# Patient Record
Sex: FEMALE | Race: WHITE | Employment: UNEMPLOYED | URBAN - METROPOLITAN AREA
[De-identification: names, ages, dates, MRNs, and addresses within clinical notes are randomized per-mention and may not be internally consistent; named-entity substitution may affect disease eponyms.]

---

## 2019-05-06 ENCOUNTER — HOSPITAL ENCOUNTER (EMERGENCY)
Age: 34
Discharge: HOME | End: 2019-05-06
Payer: MEDICAID

## 2019-05-06 VITALS
WEIGHT: 101 LBS | SYSTOLIC BLOOD PRESSURE: 102 MMHG | BODY MASS INDEX: 19.83 KG/M2 | DIASTOLIC BLOOD PRESSURE: 62 MMHG | OXYGEN SATURATION: 100 % | RESPIRATION RATE: 16 BRPM | HEART RATE: 66 BPM | TEMPERATURE: 98.1 F | HEIGHT: 60 IN

## 2019-05-06 DIAGNOSIS — S05.01XA CONJUNCTIVAL ABRASION, RIGHT, INITIAL ENCOUNTER: Primary | ICD-10-CM

## 2019-05-06 PROCEDURE — 99283 EMERGENCY DEPT VISIT LOW MDM: CPT

## 2019-05-06 PROCEDURE — 6370000000 HC RX 637 (ALT 250 FOR IP): Performed by: PHYSICIAN ASSISTANT

## 2019-05-06 RX ORDER — POLYMYXIN B SULF/TRIMETHOPRIM 10000-1/ML
1 DROPS OPHTHALMIC (EYE) EVERY 4 HOURS
Qty: 1 BOTTLE | Refills: 0 | Status: SHIPPED | OUTPATIENT
Start: 2019-05-06 | End: 2019-05-11

## 2019-05-06 RX ORDER — IBUPROFEN 600 MG/1
600 TABLET ORAL ONCE
Status: COMPLETED | OUTPATIENT
Start: 2019-05-06 | End: 2019-05-06

## 2019-05-06 RX ORDER — POLYMYXIN B SULFATE AND TRIMETHOPRIM 1; 10000 MG/ML; [USP'U]/ML
2 SOLUTION OPHTHALMIC ONCE
Status: COMPLETED | OUTPATIENT
Start: 2019-05-06 | End: 2019-05-06

## 2019-05-06 RX ADMIN — FLUORESCEIN SODIUM 1 MG: 1 STRIP OPHTHALMIC at 08:25

## 2019-05-06 RX ADMIN — POLYMYXIN B SULFATE, TRIMETHOPRIM SULFATE 2 DROP: 10000; 1 SOLUTION/ DROPS OPHTHALMIC at 09:12

## 2019-05-06 RX ADMIN — IBUPROFEN 600 MG: 600 TABLET, FILM COATED ORAL at 08:47

## 2019-05-06 ASSESSMENT — VISUAL ACUITY
OU: 20/70
OS: 20/15
OD: 20/70

## 2019-05-06 ASSESSMENT — PAIN SCALES - GENERAL
PAINLEVEL_OUTOF10: 9
PAINLEVEL_OUTOF10: 9

## 2019-05-06 ASSESSMENT — PAIN DESCRIPTION - PAIN TYPE: TYPE: ACUTE PAIN

## 2019-05-06 ASSESSMENT — PAIN DESCRIPTION - LOCATION: LOCATION: EYE

## 2019-05-06 ASSESSMENT — PAIN DESCRIPTION - DESCRIPTORS: DESCRIPTORS: STABBING

## 2019-05-06 ASSESSMENT — PAIN DESCRIPTION - ORIENTATION: ORIENTATION: RIGHT

## 2019-05-06 ASSESSMENT — PAIN DESCRIPTION - FREQUENCY: FREQUENCY: INTERMITTENT

## 2019-05-06 ASSESSMENT — PAIN DESCRIPTION - ONSET: ONSET: ON-GOING

## 2019-05-06 ASSESSMENT — PAIN DESCRIPTION - PROGRESSION: CLINICAL_PROGRESSION: GRADUALLY WORSENING

## 2019-05-06 NOTE — ED PROVIDER NOTES
[address] ED  EMERGENCY DEPARTMENT ENCOUNTER        Pt Name: Luis Chamorro  MRN: [id number]  [place] 1985  Date of evaluation: 5/6/2019  Provider: GARTH [place]  PCP: Aniyah Dykes MD    Patient was seen and evaluated by myself in conjunction with [facility], [address] supervising FELICIA. Supervising physician was on site and available for consult however Gisela Montes De Oca DO.  did not  independently see the patient. CHIEF COMPLAINT       Chief Complaint   Patient presents with    Foreign Body     Pt states that her daughter pked her in the eye last night. Pt wke up this AM with right eye pain and unable to keep it open. HISTORY OF PRESENT ILLNESS   (Location/Symptom, Timing/Onset, Context/Setting, Quality, Duration, Modifying Factors, Severity)  Note limiting factors. Luis Chamorro is a 35 y.o. female who presents for evaluation of right eye redness and foreign body sensation which is been going on since yesterday evening. Patient notes that her daughter was sitting on her lap yesterday afternoon and \"poked in the eye\" with her finger. Patient has not seen any foreign body in her eye by has sensation of FB. She endorses eye redness, tearing, eyes matted shut this morning and photophobia. She endorses blurred vision, no double vision. She denies pain with moving the eye. She notes that her last tetanus shot was about 3 years ago. She denies use of contact lenses. She denies having a personal optometrist/ophthalmologist.  She denies past medical history of eye issues. She denies headaches, congestion, runny nose, ear drainage. She denies nausea or vomiting. She denies any fevers. She denies past medical history of significant viral infection, chemotherapy, diabetes, other immunosuppression. She does not take any medicines on a regular basis other than Suboxone.      Nursing Notes were all reviewed and agreed with or any disagreements were addressed  in the HPI. REVIEW OF SYSTEMS    (2-9 systems for level 4, 10 or more for level 5)     Review of Systems    Positives and Pertinent negatives as per HPI. Except as noted abovein the ROS, all other systems were reviewed and negative.        PAST MEDICAL HISTORY     Past Medical History:   Diagnosis Date    [facility]VT [facility] thrombosis) ([facility] [address]     History of blood clot to lungs during pregnancy     Kidney congenitally absent, right     Late prenatal care     limited prenatal care    Precipitous delivery 1/19/2016    UTI (lower urinary tract infection)          SURGICAL HISTORY     Past Surgical History:   Procedure Laterality Date    APPENDECTOMY      CYSTOSCOPY N/A 8/14/14    DIAGNOSTIC LAPAROSCOPY    DILATION AND CURETTAGE OF UTERUS  8/2014         CURRENTMEDICATIONS       Discharge Medication List as of 5/6/2019  9:04 AM      CONTINUE these medications which have NOT CHANGED    Details   Buprenorphine HCl-Naloxone HCl (SUBOXONE SL) Place 8 mg under the tongue 2 times dailyHistorical Med      ibuprofen (ADVIL;MOTRIN) 800 MG tablet Take 1 tablet by mouth every 8 hours, Disp-60 tablet, R-1      Prenatal MV-Min-Fe Fum-FA-DHA (PRENATAL 1 PO) Take by mouth               ALLERGIES     Amoxicillin    FAMILYHISTORY       Family History   Problem Relation Age of Onset    High Blood Pressure Father     Cancer Maternal Grandmother     High Blood Pressure Maternal Grandmother     Heart Disease Maternal Grandfather     High Blood Pressure Maternal Grandfather           SOCIAL HISTORY       Social History     Socioeconomic History    Marital status: Single     Spouse name: None    Number of children: None    Years of education: None    Highest education level: None   Occupational History    None   Social Needs    Financial resource strain: None    Food insecurity:     Worry: None     Inability: None    Transportation needs:     Medical: None     Non-medical: None   Tobacco Use    Smoking status: Current Every icterus. Neck: Normal range of motion. Neck supple. Pulmonary/Chest: Effort normal. No respiratory distress. Neurological: She is alert and oriented to person, place, and time. Skin: Skin is warm and dry. She is not diaphoretic. Psychiatric: She has a normal mood and affect. Her behavior is normal.   Nursing note and vitals reviewed. DIAGNOSTIC RESULTS       No orders to display     [unfilled]      PROCEDURES   Unless otherwise noted below, none     Procedures   Observed slit lamp and woods examinations performed by Mir Woo PA-C     CRITICAL CARE TIME   N/A    CONSULTS:  None      EMERGENCY DEPARTMENT COURSE and DIFFERENTIALDIAGNOSIS/MDM:   Vitals:    Vitals:    05/06/19 0805 05/06/19 0916   BP: (!) 147/84 102/62   Pulse: 78 66   Resp: 18 16   Temp: 98.1 °F (36.7 °C)    TempSrc: Oral    SpO2: 98% 100%   Weight: 101 lb (45.8 kg)    Height: 5' (1.524 m)        Patient was given thefollowing medications:  Medications   fluorescein ophthalmic strip 1 mg (1 mg Both Eyes Given 5/6/19 0825)   ibuprofen (ADVIL;MOTRIN) tablet 600 mg (600 mg Oral Given 5/6/19 0847)   trimethoprim-polymyxin b (POLYTRIM) ophthalmic solution 2 drop (2 drops Right Eye Given 5/6/19 0912)       34 yo female presents with red eye and drainage s/p being poked in the eye by her young child. Ddx includes but is not limited to: corneal abrasion, corneal ulceration, eye foreign body, globe rupture, hyphema. Physical exam reassuring. + fluoresciene uptake at right conjunctiva, sparing the cornea, without evidence of FB or ulceration. Pt discharged with ophthalmic antibiotics and instructions to follow up with [facility] within 24 hours, or return here for new/worsening symptoms. FINAL IMPRESSION      1.  Conjunctival abrasion, right, initial encounter          DISPOSITION/PLAN   DISPOSITION Decision To Discharge 05/06/2019 08:40:02 AM      PATIENT REFERREDTO:  Salma Palmer MD  [address] [address]  [phone]    Schedule an appointment as soon as possible for a visit today  As needed, If symptoms worsen    [facility] [address] [facility]  [facility] [id number]  [phone]  Schedule an appointment as soon as possible for a visit   Follow up within the next 24 hours    [place] [facility]  [address] [facility]  [phone]    As needed, If symptoms worsen      DISCHARGE MEDICATIONS:  Discharge Medication List as of 5/6/2019  9:04 AM      START taking these medications    Details   POLYTRIM 44793-2.1 UNIT/ML-% ophthalmic solution Place 1 drop into the right eye every 4 hours for 5 days, Disp-1 Bottle, R-0, DAWPrint             DISCONTINUED MEDICATIONS:  Discharge Medication List as of 5/6/2019  9:04 AM          Called by [address] [address], polytrim ophthalmic on backorder. Will switch to cipro 0.3 ophthalmic two drops 4 times a day x 5 days.         (Please note that portions ofthis note were completed with a voice recognition program.  Efforts were made to edit the dictations but occasionally words are mis-transcribed.)    Kj Crowder (electronically signed)           Kj Crowder  05/06/19 8348

## 2020-02-11 ENCOUNTER — APPOINTMENT (OUTPATIENT)
Dept: CT IMAGING | Age: 35
End: 2020-02-11
Payer: MEDICAID

## 2020-02-11 ENCOUNTER — HOSPITAL ENCOUNTER (EMERGENCY)
Age: 35
Discharge: HOME | End: 2020-02-11
Attending: EMERGENCY MEDICINE
Payer: MEDICAID

## 2020-02-11 VITALS
BODY MASS INDEX: 22.58 KG/M2 | WEIGHT: 115 LBS | HEART RATE: 102 BPM | RESPIRATION RATE: 16 BRPM | HEIGHT: 60 IN | OXYGEN SATURATION: 97 % | DIASTOLIC BLOOD PRESSURE: 74 MMHG | TEMPERATURE: 99.4 F | SYSTOLIC BLOOD PRESSURE: 105 MMHG

## 2020-02-11 LAB
A/G RATIO: 1.3 (ref 1.1–2.2)
ALBUMIN SERPL-MCNC: 4 G/DL (ref 3.4–5)
ALP BLD-CCNC: 83 U/L (ref 40–129)
ALT SERPL-CCNC: 10 U/L (ref 10–40)
ANION GAP SERPL CALCULATED.3IONS-SCNC: 14 MMOL/L (ref 3–16)
AST SERPL-CCNC: 19 U/L (ref 15–37)
BACTERIA: ABNORMAL /HPF
BASOPHILS ABSOLUTE: 0 K/UL (ref 0–0.2)
BASOPHILS RELATIVE PERCENT: 0.5 %
BILIRUB SERPL-MCNC: 0.5 MG/DL (ref 0–1)
BILIRUBIN URINE: NEGATIVE
BLOOD, URINE: ABNORMAL
BUN BLDV-MCNC: 11 MG/DL (ref 7–20)
CALCIUM SERPL-MCNC: 8.7 MG/DL (ref 8.3–10.6)
CHLORIDE BLD-SCNC: 101 MMOL/L (ref 99–110)
CLARITY: ABNORMAL
CO2: 21 MMOL/L (ref 21–32)
COLOR: YELLOW
CREAT SERPL-MCNC: 0.8 MG/DL (ref 0.6–1.1)
EOSINOPHILS ABSOLUTE: 0.1 K/UL (ref 0–0.6)
EOSINOPHILS RELATIVE PERCENT: 1.4 %
EPITHELIAL CELLS, UA: ABNORMAL /HPF
GFR AFRICAN AMERICAN: >60
GFR NON-AFRICAN AMERICAN: >60
GLOBULIN: 3.2 G/DL
GLUCOSE BLD-MCNC: 103 MG/DL (ref 70–99)
GLUCOSE URINE: NEGATIVE MG/DL
HCG QUALITATIVE: NEGATIVE
HCT VFR BLD CALC: 37.5 % (ref 36–48)
HEMOGLOBIN: 13 G/DL (ref 12–16)
KETONES, URINE: NEGATIVE MG/DL
LACTIC ACID: 1.4 MMOL/L (ref 0.4–2)
LEUKOCYTE ESTERASE, URINE: ABNORMAL
LIPASE: 11 U/L (ref 13–60)
LYMPHOCYTES ABSOLUTE: 1.1 K/UL (ref 1–5.1)
LYMPHOCYTES RELATIVE PERCENT: 13.3 %
MCH RBC QN AUTO: 31.9 PG (ref 26–34)
MCHC RBC AUTO-ENTMCNC: 34.6 G/DL (ref 31–36)
MCV RBC AUTO: 92.2 FL (ref 80–100)
MICROSCOPIC EXAMINATION: YES
MONOCYTES ABSOLUTE: 0.1 K/UL (ref 0–1.3)
MONOCYTES RELATIVE PERCENT: 0.7 %
NEUTROPHILS ABSOLUTE: 6.8 K/UL (ref 1.7–7.7)
NEUTROPHILS RELATIVE PERCENT: 84.1 %
NITRITE, URINE: POSITIVE
PDW BLD-RTO: 12.8 % (ref 12.4–15.4)
PH UA: 6 (ref 5–8)
PLATELET # BLD: 197 K/UL (ref 135–450)
PMV BLD AUTO: 9 FL (ref 5–10.5)
POTASSIUM REFLEX MAGNESIUM: 4.5 MMOL/L (ref 3.5–5.1)
PROTEIN UA: 100 MG/DL
RBC # BLD: 4.07 M/UL (ref 4–5.2)
RBC UA: ABNORMAL /HPF (ref 0–2)
SODIUM BLD-SCNC: 136 MMOL/L (ref 136–145)
SPECIFIC GRAVITY UA: 1.02 (ref 1–1.03)
TOTAL PROTEIN: 7.2 G/DL (ref 6.4–8.2)
URINE REFLEX TO CULTURE: YES
URINE TYPE: ABNORMAL
UROBILINOGEN, URINE: 0.2 E.U./DL
WBC # BLD: 8.1 K/UL (ref 4–11)
WBC UA: >100 /HPF (ref 0–5)

## 2020-02-11 PROCEDURE — 96375 TX/PRO/DX INJ NEW DRUG ADDON: CPT

## 2020-02-11 PROCEDURE — 74176 CT ABD & PELVIS W/O CONTRAST: CPT

## 2020-02-11 PROCEDURE — 87086 URINE CULTURE/COLONY COUNT: CPT

## 2020-02-11 PROCEDURE — 80053 COMPREHEN METABOLIC PANEL: CPT

## 2020-02-11 PROCEDURE — 2580000003 HC RX 258: Performed by: EMERGENCY MEDICINE

## 2020-02-11 PROCEDURE — 84703 CHORIONIC GONADOTROPIN ASSAY: CPT

## 2020-02-11 PROCEDURE — 96365 THER/PROPH/DIAG IV INF INIT: CPT

## 2020-02-11 PROCEDURE — 87186 SC STD MICRODIL/AGAR DIL: CPT

## 2020-02-11 PROCEDURE — 83690 ASSAY OF LIPASE: CPT

## 2020-02-11 PROCEDURE — 99283 EMERGENCY DEPT VISIT LOW MDM: CPT

## 2020-02-11 PROCEDURE — 81001 URINALYSIS AUTO W/SCOPE: CPT

## 2020-02-11 PROCEDURE — 85025 COMPLETE CBC W/AUTO DIFF WBC: CPT

## 2020-02-11 PROCEDURE — 6360000002 HC RX W HCPCS: Performed by: EMERGENCY MEDICINE

## 2020-02-11 PROCEDURE — 87077 CULTURE AEROBIC IDENTIFY: CPT

## 2020-02-11 PROCEDURE — 83605 ASSAY OF LACTIC ACID: CPT

## 2020-02-11 RX ORDER — 0.9 % SODIUM CHLORIDE 0.9 %
30 INTRAVENOUS SOLUTION INTRAVENOUS ONCE
Status: COMPLETED | OUTPATIENT
Start: 2020-02-11 | End: 2020-02-11

## 2020-02-11 RX ORDER — GABAPENTIN 300 MG/1
300 CAPSULE ORAL 3 TIMES DAILY
COMMUNITY

## 2020-02-11 RX ORDER — ONDANSETRON 4 MG/1
4 TABLET, FILM COATED ORAL EVERY 8 HOURS PRN
Qty: 20 TABLET | Refills: 0 | Status: SHIPPED | OUTPATIENT
Start: 2020-02-11

## 2020-02-11 RX ORDER — KETOROLAC TROMETHAMINE 30 MG/ML
30 INJECTION, SOLUTION INTRAMUSCULAR; INTRAVENOUS ONCE
Status: COMPLETED | OUTPATIENT
Start: 2020-02-11 | End: 2020-02-11

## 2020-02-11 RX ORDER — CEFUROXIME AXETIL 500 MG/1
500 TABLET ORAL 2 TIMES DAILY
Qty: 20 TABLET | Refills: 0 | Status: SHIPPED | OUTPATIENT
Start: 2020-02-11 | End: 2020-02-21

## 2020-02-11 RX ADMIN — CEFTRIAXONE SODIUM 1 G: 1 INJECTION, POWDER, FOR SOLUTION INTRAMUSCULAR; INTRAVENOUS at 10:21

## 2020-02-11 RX ADMIN — KETOROLAC TROMETHAMINE 30 MG: 30 INJECTION, SOLUTION INTRAMUSCULAR at 10:22

## 2020-02-11 RX ADMIN — SODIUM CHLORIDE 1566 ML: 9 INJECTION, SOLUTION INTRAVENOUS at 10:21

## 2020-02-11 ASSESSMENT — PAIN SCALES - GENERAL
PAINLEVEL_OUTOF10: 10
PAINLEVEL_OUTOF10: 10

## 2020-02-11 ASSESSMENT — PAIN DESCRIPTION - ORIENTATION: ORIENTATION: LEFT

## 2020-02-11 ASSESSMENT — PAIN DESCRIPTION - LOCATION: LOCATION: FLANK

## 2020-02-11 NOTE — ED NOTES
Discharge instructions reviewed with pt. She verbalized understanding. Pt ambulated out of ED in stable condition.       Art Wheeler RN  02/11/20 0768

## 2020-02-11 NOTE — ED PROVIDER NOTES
CHIEF COMPLAINT  Flank Pain (pt c/o left sided flank pain with emesis since yesterday)      HISTORY OF PRESENT ILLNESS  Eleni Vasques is a 29 y.o. female presents to the ED with dysuria and flank pain. The patient states that she has had dysuria and urinary frequency last few days, started having cramping pain in her low abdomen and then in her left flank last night, felt like she is had a fever and has had nausea with an episode of vomitus vomiting this morning. The patient has had no cough or rhinorrhea is not short of breath. She is not pregnant. She has a congenitally absent right kidney. No other complaints, modifying factors or associated symptoms. I have reviewed the following from the nursing documentation.     Past Medical History:   Diagnosis Date    [facility]     History of blood clot to lungs during pregnancy     Kidney congenitally absent, right     Late prenatal care     limited prenatal care    Precipitous delivery 1/19/2016    UTI (lower urinary tract infection)      Past Surgical History:   Procedure Laterality Date    APPENDECTOMY      CYSTOSCOPY N/A 8/14/14, 1/24/2020    DIAGNOSTIC LAPAROSCOPY    DILATION AND CURETTAGE OF UTERUS  8/2014     Family History   Problem Relation Age of Onset    High Blood Pressure Father     Cancer Maternal Grandmother     High Blood Pressure Maternal Grandmother     Heart Disease Maternal Grandfather     High Blood Pressure Maternal Grandfather      Social History     Socioeconomic History    Marital status: Single     Spouse name: Not on file    Number of children: Not on file    Years of education: Not on file    Highest education level: Not on file   Occupational History    Not on file   Social Needs    Financial resource strain: Not on file    Food insecurity:     Worry: Not on file     Inability: Not on file    Transportation needs:     Medical: Not on file     Non-medical: Not on file   Tobacco Use    Smoking status: Current Every Day Smoker     Packs/day: 0.50     Types: Cigarettes    Smokeless tobacco: Never Used   Substance and Sexual Activity    Alcohol use: No    Drug use: No    Sexual activity: Yes     Partners: Male   Lifestyle    Physical activity:     Days per week: Not on file     Minutes per session: Not on file    Stress: Not on file   Relationships    Social connections:     Talks on phone: Not on file     Gets together: Not on file     Attends Caodaism service: Not on file     Active member of club or organization: Not on file     Attends meetings of clubs or organizations: Not on file     Relationship status: Not on file    Intimate partner violence:     Fear of current or ex partner: Not on file     Emotionally abused: Not on file     Physically abused: Not on file     Forced sexual activity: Not on file   Other Topics Concern    Not on file   Social History Narrative    Not on file     Current Facility-Administered Medications   Medication Dose Route Frequency Provider Last Rate Last Dose    0.9 % sodium chloride IV bolus 1,566 mL  30 mL/kg Intravenous Once Luanne Contreras MD        ketorolac (TORADOL) injection 30 mg  30 mg Intravenous Once Luanne Contreras MD        cefTRIAXone (ROCEPHIN) 1 g IVPB in 50 mL D5W minibag  1 g Intravenous Once Luanne Contreras MD         Current Outpatient Medications   Medication Sig Dispense Refill    gabapentin (NEURONTIN) 300 MG capsule Take 300 mg by mouth 3 times daily.  Buprenorphine HCl-Naloxone HCl (SUBOXONE SL) Place 8.5 mg under the tongue 2 times daily       ibuprofen (ADVIL;MOTRIN) 800 MG tablet Take 1 tablet by mouth every 8 hours 60 tablet 1    Prenatal MV-Min-Fe Fum-FA-DHA (PRENATAL 1 PO) Take by mouth       Allergies   Allergen Reactions    Amoxicillin        REVIEW OF SYSTEMS  10 systems reviewed, pertinent positives per HPI otherwise noted to be negative.     PHYSICAL EXAM  /84   Pulse 126   Temp 99.4 °F (37.4 °C) (Oral)   Resp 16   Ht 5' (1.524 m)   Wt 115 lb (52.2 kg)   SpO2 100%   BMI 22.46 kg/m²   GENERAL APPEARANCE: Awake and alert. Cooperative. Tired but nontoxic, mild distress  HEAD: Normocephalic. Atraumatic. EYES: PERRL. EOM's grossly intact. ENT: Mucous membranes are moist.   NECK: Supple. HEART: Tachycardic but regular rhythm. No murmurs  LUNGS: Respirations are unlabored. Lungs are clear bilaterally. ABDOMEN: Soft. Non-distended. Non-tender. No guarding or rebound. Normal bowel sounds. EXTREMITIES: No peripheral edema. Moves all extremities equally. All extremities neurovascularly intact. SKIN: Warm and dry. No acute rashes. NEUROLOGICAL: Alert and oriented. No gross facial drooping.y  PSYCHIATRIC: Normal mood and affect. LABS  I have reviewed all labs for this visit.    Results for orders placed or performed during the hospital encounter of 02/11/20   CBC Auto Differential   Result Value Ref Range    WBC 8.1 4.0 - 11.0 K/uL    RBC 4.07 4.00 - 5.20 M/uL    Hemoglobin 13.0 12.0 - 16.0 g/dL    Hematocrit 37.5 36.0 - 48.0 %    MCV 92.2 80.0 - 100.0 fL    MCH 31.9 26.0 - 34.0 pg    MCHC 34.6 31.0 - 36.0 g/dL    RDW 12.8 12.4 - 15.4 %    Platelets 199 923 - 456 K/uL    MPV 9.0 5.0 - 10.5 fL    Neutrophils % 84.1 %    Lymphocytes % 13.3 %    Monocytes % 0.7 %    Eosinophils % 1.4 %    Basophils % 0.5 %    Neutrophils Absolute 6.8 1.7 - 7.7 K/uL    Lymphocytes Absolute 1.1 1.0 - 5.1 K/uL    Monocytes Absolute 0.1 0.0 - 1.3 K/uL    Eosinophils Absolute 0.1 0.0 - 0.6 K/uL    Basophils Absolute 0.0 0.0 - 0.2 K/uL   Comprehensive Metabolic Panel w/ Reflex to MG   Result Value Ref Range    Sodium 136 136 - 145 mmol/L    Potassium reflex Magnesium 4.5 3.5 - 5.1 mmol/L    Chloride 101 99 - 110 mmol/L    CO2 21 21 - 32 mmol/L    Anion Gap 14 3 - 16    Glucose 103 (H) 70 - 99 mg/dL    BUN 11 7 - 20 mg/dL    CREATININE 0.8 0.6 - 1.1 mg/dL    GFR Non-African American >60 >60    GFR  American >60 >60    Calcium 8.7 8.3 - 10.6 mg/dL    Total Protein 7.2 6.4 - 8.2 g/dL    Alb 4.0 3.4 - 5.0 g/dL    Albumin/Globulin Ratio 1.3 1.1 - 2.2    Total Bilirubin 0.5 0.0 - 1.0 mg/dL    Alkaline Phosphatase 83 40 - 129 U/L    ALT 10 10 - 40 U/L    AST 19 15 - 37 U/L    Globulin 3.2 g/dL   HCG Qualitative, Serum   Result Value Ref Range    hCG Qual Negative Detects HCG level >10 MIU/mL   Lipase   Result Value Ref Range    Lipase 11.0 (L) 13.0 - 60.0 U/L   Urinalysis Reflex to Culture   Result Value Ref Range    Color, UA Yellow Straw/Yellow    Clarity, UA CLOUDY (A) Clear    Glucose, Ur Negative Negative mg/dL    Bilirubin Urine Negative Negative    Ketones, Urine Negative Negative mg/dL    Specific Gravity, UA 1.020 1.005 - 1.030    Blood, Urine MODERATE (A) Negative    pH, UA 6.0 5.0 - 8.0    Protein,  (A) Negative mg/dL    Urobilinogen, Urine 0.2 <2.0 E.U./dL    Nitrite, Urine POSITIVE (A) Negative    Leukocyte Esterase, Urine MODERATE (A) Negative    Microscopic Examination YES     Urine Type NotGiven     Urine Reflex to Culture Yes    Microscopic Urinalysis   Result Value Ref Range    WBC, UA >100 (A) 0 - 5 /HPF    RBC, UA 3-5 (A) 0 - 2 /HPF    Epi Cells 10-20 /HPF    Bacteria, UA 4+ (A) /HPF         RADIOLOGY  Ct Abdomen Pelvis Wo Contrast Additional Contrast? None    Result Date: 2/11/2020  EXAMINATION: CT OF THE ABDOMEN AND PELVIS WITHOUT CONTRAST 2/11/2020 10:33 am TECHNIQUE: CT of the abdomen and pelvis was performed without the administration of intravenous contrast. Multiplanar reformatted images are provided for review. Dose modulation, iterative reconstruction, and/or weight based adjustment of the mA/kV was utilized to reduce the radiation dose to as low as reasonably achievable.  COMPARISON: 01/12/2015 HISTORY: ORDERING SYSTEM PROVIDED HISTORY: flank pain, fever TECHNOLOGIST PROVIDED HISTORY: Reason for exam:->flank pain, fever Additional Contrast?->None Is the patient pregnant?->No Reason for Exam: lt flank pain, fever Acuity: Acute Type of Exam: Initial FINDINGS: Lower Chest: Lung bases clear Organs: Absent right kidney. Compensatory hypertrophy of the left kidney. No urolithiasis or hydronephrosis. Remaining solid organs and gallbladder have an unremarkable noncontrast appearance. GI/Bowel: No gastrointestinal abnormality demonstrated. Appendix not visualized Pelvis: Uterus is located in the extreme left side of the pelvis but is grossly unremarkable. Cholecystectomy clips are located between the lower uterine segment and urinary bladder. The ovaries are normal in size and fairly laterally situated, anterior to the psoas muscles. There is slight haziness of the perivesical fat. There is a small amount of free pelvic fluid. Peritoneum/Retroperitoneum: No abdominal ascites or pneumoperitoneum. Aorta unremarkable Bones/Soft Tissues: No bony abnormality     1. Appearance of the urinary bladder suggests cystitis. Correlate with urinalysis 2. Absent right kidney. No evidence of left-sided obstructive uropathy       ED COURSE/MDM  Patient seen and evaluated. Old records reviewed. Labs and imaging reviewed and results discussed with patient. I considered UTI, pyelonephritis or kidney stone, CT showed no kidney stone, urine showed signs of infection. The patient has a congenitally absent right kidney, so we will give fluids, antibiotics, antiemetics and antipyretics, she is feeling better at 11:20 AM.  I advised that she be admitted to the hospital given that she has a congenitally absent kidney, the patient declined because she has a 3year-old at home and she feels better. I told her she can come back at any time and she should especially be back if she is vomiting or having more fevers. Otherwise she is to follow-up with her doctor within 4 days. Patient was given scripts for the following medications. I counseled patient how to take these medications.    New Prescriptions    CEFUROXIME (CEFTIN) 500 MG TABLET    Take 1 tablet by mouth 2 times daily for 10 days    ONDANSETRON (ZOFRAN) 4 MG TABLET    Take 1 tablet by mouth every 8 hours as needed for Nausea       CLINICAL IMPRESSION  1. Acute pyelonephritis        Blood pressure 123/84, pulse 126, temperature 99.4 °F (37.4 °C), temperature source Oral, resp. rate 16, height 5' (1.524 m), weight 115 lb (52.2 kg), SpO2 100 %, unknown if currently breastfeeding. DISPOSITION  Cheri Dalal was discharged to home in stable condition.                  Roberto Ortiz MD  02/11/20 1735

## 2020-02-13 LAB
ORGANISM: ABNORMAL
URINE CULTURE, ROUTINE: ABNORMAL

## 2025-04-02 ENCOUNTER — OFFICE VISIT (OUTPATIENT)
Age: 40
End: 2025-04-02

## 2025-04-02 VITALS
OXYGEN SATURATION: 96 % | HEART RATE: 60 BPM | DIASTOLIC BLOOD PRESSURE: 63 MMHG | TEMPERATURE: 98.3 F | SYSTOLIC BLOOD PRESSURE: 104 MMHG

## 2025-04-02 DIAGNOSIS — N30.00 ACUTE CYSTITIS WITHOUT HEMATURIA: Primary | ICD-10-CM

## 2025-04-02 PROBLEM — B19.10 HEPATITIS B VIRUS INFECTION: Status: ACTIVE | Noted: 2020-10-29

## 2025-04-02 PROBLEM — R87.619 ABNORMAL PAP SMEAR OF CERVIX: Status: ACTIVE | Noted: 2020-01-07

## 2025-04-02 PROBLEM — E03.9 HYPOTHYROIDISM: Status: ACTIVE | Noted: 2021-10-25

## 2025-04-02 PROBLEM — D06.9 CERVICAL INTRAEPITHELIAL NEOPLASIA GRADE III WITH SEVERE DYSPLASIA: Status: ACTIVE | Noted: 2025-04-02

## 2025-04-02 PROBLEM — R87.610 ASCUS WITH POSITIVE HIGH RISK HPV CERVICAL: Status: ACTIVE | Noted: 2019-12-19

## 2025-04-02 PROBLEM — F41.9 ANXIETY: Status: ACTIVE | Noted: 2021-10-25

## 2025-04-02 PROBLEM — Z71.3 DIETARY COUNSELING AND SURVEILLANCE: Status: ACTIVE | Noted: 2020-10-29

## 2025-04-02 PROBLEM — Z86.711 HISTORY OF PULMONARY EMBOLISM: Status: ACTIVE | Noted: 2020-04-23

## 2025-04-02 PROBLEM — K64.9 HEMORRHOIDS: Status: ACTIVE | Noted: 2019-05-03

## 2025-04-02 PROBLEM — R53.83 LOW ENERGY: Status: ACTIVE | Noted: 2021-10-25

## 2025-04-02 PROBLEM — R87.810 ASCUS WITH POSITIVE HIGH RISK HPV CERVICAL: Status: ACTIVE | Noted: 2019-12-19

## 2025-04-02 PROBLEM — N94.10 DYSPAREUNIA IN FEMALE: Status: ACTIVE | Noted: 2019-05-03

## 2025-04-02 LAB
BILIRUBIN, POC: NORMAL
BLOOD URINE, POC: NEGATIVE
CLARITY, POC: CLEAR
COLOR, POC: NORMAL
GLUCOSE URINE, POC: 100 MG/DL
KETONES, POC: NEGATIVE MG/DL
LEUKOCYTE EST, POC: NORMAL
NITRITE, POC: POSITIVE
PH, POC: 7
PROTEIN, POC: 30 MG/DL
SPECIFIC GRAVITY, POC: 1.02
UROBILINOGEN, POC: 2 MG/DL

## 2025-04-02 RX ORDER — ONDANSETRON 4 MG/1
4 TABLET, ORALLY DISINTEGRATING ORAL EVERY 8 HOURS PRN
Qty: 15 TABLET | Refills: 0 | Status: SHIPPED | OUTPATIENT
Start: 2025-04-02 | End: 2025-04-07

## 2025-04-02 RX ORDER — SULFAMETHOXAZOLE AND TRIMETHOPRIM 800; 160 MG/1; MG/1
1 TABLET ORAL 2 TIMES DAILY
Qty: 14 TABLET | Refills: 0 | Status: SHIPPED | OUTPATIENT
Start: 2025-04-02 | End: 2025-04-09

## 2025-04-02 RX ORDER — BUPRENORPHINE 300 MG/1
SOLUTION SUBCUTANEOUS
COMMUNITY
Start: 2025-03-24

## 2025-04-02 RX ORDER — PHENAZOPYRIDINE HYDROCHLORIDE 200 MG/1
200 TABLET, FILM COATED ORAL 3 TIMES DAILY PRN
Qty: 9 TABLET | Refills: 0 | Status: SHIPPED | OUTPATIENT
Start: 2025-04-02 | End: 2025-04-05

## 2025-04-02 ASSESSMENT — ENCOUNTER SYMPTOMS
NAUSEA: 1
VOMITING: 0

## 2025-04-02 NOTE — PROGRESS NOTES
There has been no fever. Associated symptoms include frequency, nausea and urgency. Pertinent negatives include no chills, discharge, flank pain, hematuria, hesitancy, sweats or vomiting. Treatments tried: Azo. The treatment provided mild relief. Her past medical history is significant for recurrent UTIs (in the past, not recently).       VITAL SIGNS  Vitals:    04/02/25 1249   BP: 104/63   Pulse: 60   Temp: 98.3 °F (36.8 °C)   TempSrc: Oral   SpO2: 96%       REVIEW OF SYSTEMS  Review of Systems   Constitutional:  Negative for chills.   Gastrointestinal:  Positive for nausea. Negative for vomiting.   Genitourinary:  Positive for dysuria, frequency and urgency. Negative for flank pain, hematuria and hesitancy.     Pertinent positives and negatives as above, or may be included within the HPI.    PHYSICAL EXAM  Physical Exam  Vitals and nursing note reviewed.   Constitutional:       General: She is not in acute distress.     Appearance: Normal appearance. She is not ill-appearing.   HENT:      Head: Normocephalic and atraumatic.   Eyes:      Conjunctiva/sclera: Conjunctivae normal.   Cardiovascular:      Rate and Rhythm: Normal rate and regular rhythm.      Heart sounds: No murmur heard.     No friction rub. No gallop.   Pulmonary:      Effort: Pulmonary effort is normal. No respiratory distress.      Breath sounds: Normal breath sounds. No stridor. No wheezing, rhonchi or rales.   Abdominal:      General: Abdomen is flat. Bowel sounds are normal. There is no distension.      Palpations: Abdomen is soft. There is no mass.      Tenderness: There is abdominal tenderness (mild) in the suprapubic area. There is no right CVA tenderness or left CVA tenderness.      Hernia: No hernia is present.   Skin:     General: Skin is warm and dry.   Neurological:      Mental Status: She is alert and oriented to person, place, and time.         PROCEDURES  Unless otherwise noted below, none  Procedures    RESULTS:  Results for orders

## 2025-04-02 NOTE — PATIENT INSTRUCTIONS
Fatmata,    Thank you for trusting [facility] with your care. Your decision to come to us means a lot and we are honored to be part of your healthcare journey. We value your trust and hope your experience with us was positive and met your expectations.    We're always looking for ways to improve, and your feedback is incredibly important to us. You will receive a text or email soon asking you how your visit went. for If you could take a moment to share your thoughts, it would mean the world to us. Your input helps us better serve you and others in the community.     Thank you again for choosing us. We're grateful for the opportunity to care for you and your loved ones. We hope to see you again - though we always wish you health and wellness!    Warm regards,    [facility]    Cristy Medeiros PA-C and PERFECTO Joseph    An urine culture was sent to confirm our diagnosis, identify the bacteria causing the infection, and ensure we are using the correct antibiotic  We will change your treatment plan if necessary after the culture results return  Take antibiotics as prescribed  Take the antibiotics until completed, do not stop unless otherwise directed by a healthcare provider.  Recommend daily yogurt or other probiotics while on antibiotics.  If Pyridium was prescribed for pain, please note this can turn your urine a brown/orange/red color  Be sure to increase your water intake during treatment  You can take ibuprofen (Motrin or Advil) or acetaminophen (Tylenol) for pain symptoms per box instructions unless you have been advised to avoid these for certain reasons  Follow up with your PCP within 5 days or, if unable, you can return to the clinic if symptoms persist beyond 5 days or if symptoms worsen  If fevers, shivering, nausea, vomiting, shortness of breath, chest pain, severe abdominal or back pain develops, or if symptoms continue to worsen despite treatment plan, follow up in the

## 2025-04-04 ENCOUNTER — RESULTS FOLLOW-UP (OUTPATIENT)
Age: 40
End: 2025-04-04

## 2025-04-04 LAB
BACTERIA UR CULT: ABNORMAL
ORGANISM: ABNORMAL

## 2025-04-05 NOTE — TELEPHONE ENCOUNTER
Tried to call pt at 11:44am, number went straight to automated message stating that pt voicemail hasn't been set up yet. Will try again tomorrow.

## 2025-04-28 ENCOUNTER — TRANSCRIBE ORDERS (OUTPATIENT)
Dept: ADMINISTRATIVE | Age: 40
End: 2025-04-28

## 2025-04-28 DIAGNOSIS — E03.9 HYPOTHYROIDISM, UNSPECIFIED TYPE: Primary | ICD-10-CM

## 2025-04-29 ENCOUNTER — HOSPITAL ENCOUNTER (OUTPATIENT)
Dept: ULTRASOUND IMAGING | Age: 40
Discharge: HOME | End: 2025-04-29
Attending: STUDENT IN AN ORGANIZED HEALTH CARE EDUCATION/TRAINING PROGRAM
Payer: MEDICAID

## 2025-04-29 DIAGNOSIS — E03.9 HYPOTHYROIDISM, UNSPECIFIED TYPE: ICD-10-CM

## 2025-04-29 PROCEDURE — 76536 US EXAM OF HEAD AND NECK: CPT
